# Patient Record
Sex: FEMALE | Race: WHITE | NOT HISPANIC OR LATINO | URBAN - METROPOLITAN AREA
[De-identification: names, ages, dates, MRNs, and addresses within clinical notes are randomized per-mention and may not be internally consistent; named-entity substitution may affect disease eponyms.]

---

## 2020-06-04 ENCOUNTER — OFFICE VISIT (OUTPATIENT)
Dept: URBAN - METROPOLITAN AREA CLINIC 113 | Facility: CLINIC | Age: 45
End: 2020-06-04

## 2020-07-22 ENCOUNTER — OFFICE VISIT (OUTPATIENT)
Dept: URBAN - METROPOLITAN AREA CLINIC 113 | Facility: CLINIC | Age: 45
End: 2020-07-22

## 2020-07-25 ENCOUNTER — TELEPHONE ENCOUNTER (OUTPATIENT)
Dept: URBAN - METROPOLITAN AREA CLINIC 13 | Facility: CLINIC | Age: 45
End: 2020-07-25

## 2020-07-25 RX ORDER — OMEPRAZOLE 20 MG/1
TAKE 1 CAPSULE BY MOUTH ONE HOUR BEFORE BREAKFAST CAPSULE, DELAYED RELEASE ORAL
Qty: 30 | Refills: 3 | OUTPATIENT
Start: 2018-06-12 | End: 2020-01-07

## 2020-07-25 RX ORDER — ONDANSETRON 4 MG/1
TAKE 1 TABLET EVERY 8 HOURS PRN NAUSEA TABLET, ORALLY DISINTEGRATING ORAL
Qty: 28 | Refills: 1 | OUTPATIENT
Start: 2019-01-14 | End: 2019-04-16

## 2020-07-25 RX ORDER — DICYCLOMINE HYDROCHLORIDE 10 MG/1
TAKE 1 CAPSULE EVERY 6 HOURS PRN ABDOMINAL PAIN CAPSULE ORAL
Qty: 45 | Refills: 2 | OUTPATIENT
Start: 2019-01-14 | End: 2019-04-16

## 2020-07-26 ENCOUNTER — TELEPHONE ENCOUNTER (OUTPATIENT)
Dept: URBAN - METROPOLITAN AREA CLINIC 13 | Facility: CLINIC | Age: 45
End: 2020-07-26

## 2020-07-26 RX ORDER — KETOROLAC TROMETHAMINE 10 MG/1
TABLET, FILM COATED ORAL
Qty: 20 | Refills: 0 | Status: ACTIVE | COMMUNITY
Start: 2018-07-17

## 2020-07-26 RX ORDER — GABAPENTIN 100 MG/1
CAPSULE ORAL
Qty: 30 | Refills: 0 | Status: ACTIVE | COMMUNITY
Start: 2018-05-16

## 2020-07-26 RX ORDER — LEVOTHYROXINE SODIUM 125 UG/1
TABLET ORAL
Qty: 30 | Refills: 0 | Status: ACTIVE | COMMUNITY
Start: 2019-08-08

## 2020-07-26 RX ORDER — LISINOPRIL AND HYDROCHLOROTHIAZIDE TABLETS 10; 12.5 MG/1; MG/1
TAKE 1 TABLET DAILY TABLET ORAL
Refills: 0 | Status: ACTIVE | COMMUNITY

## 2020-07-26 RX ORDER — OXYCODONE AND ACETAMINOPHEN 7.5; 325 MG/1; MG/1
TABLET ORAL
Qty: 30 | Refills: 0 | Status: ACTIVE | COMMUNITY
Start: 2018-11-29

## 2020-07-26 RX ORDER — LEVOTHYROXINE SODIUM 137 UG/1
TABLET ORAL
Qty: 30 | Refills: 0 | Status: ACTIVE | COMMUNITY
Start: 2020-02-10

## 2020-07-26 RX ORDER — LEVOTHYROXINE SODIUM 125 UG/1
TABLET ORAL
Qty: 30 | Refills: 0 | Status: ACTIVE | COMMUNITY
Start: 2019-02-25

## 2020-07-26 RX ORDER — TRAMADOL HYDROCHLORIDE AND ACETAMINOPHEN 37.5; 325 MG/1; MG/1
TAKE 1 TABLET BY MOUTH ONCE DAILY TABLET, FILM COATED ORAL
Qty: 30 | Refills: 0 | Status: ACTIVE | COMMUNITY
Start: 2019-09-18

## 2020-07-26 RX ORDER — LEVOTHYROXINE SODIUM 137 UG/1
TAKE 1 TABLET BY MOUTH AS DIRECTED TABLET ORAL
Qty: 30 | Refills: 0 | Status: ACTIVE | COMMUNITY
Start: 2020-02-04

## 2020-07-26 RX ORDER — LEVOTHYROXINE SODIUM 137 UG/1
TABLET ORAL
Qty: 30 | Refills: 0 | Status: ACTIVE | COMMUNITY
Start: 2020-05-24

## 2020-07-26 RX ORDER — FENOFIBRATE 145 MG/1
TAKE 1 TABLET DAILY TABLET, FILM COATED ORAL
Refills: 0 | Status: ACTIVE | COMMUNITY

## 2020-07-26 RX ORDER — ATORVASTATIN CALCIUM 40 MG/1
TABLET, FILM COATED ORAL
Qty: 30 | Refills: 0 | Status: ACTIVE | COMMUNITY
Start: 2018-12-17

## 2020-07-26 RX ORDER — LEVOTHYROXINE SODIUM 112 UG/1
TABLET ORAL
Qty: 30 | Refills: 0 | Status: ACTIVE | COMMUNITY
Start: 2018-12-01

## 2020-07-26 RX ORDER — ESTRADIOL 1 MG/1
TAKE 1 TABLET DAILY AS DIRECTED TABLET ORAL
Refills: 0 | Status: ACTIVE | COMMUNITY

## 2020-07-26 RX ORDER — CYANOCOBALAMIN 1000 UG/ML
INJECT 1ML INTRAMUSCULARLY WEEKLY INJECTION INTRAMUSCULAR; SUBCUTANEOUS
Refills: 3 | Status: ACTIVE | COMMUNITY

## 2020-07-26 RX ORDER — LEVOTHYROXINE SODIUM 125 UG/1
TABLET ORAL
Qty: 30 | Refills: 0 | Status: ACTIVE | COMMUNITY
Start: 2019-01-21

## 2020-07-26 RX ORDER — LEVOTHYROXINE SODIUM 125 UG/1
TABLET ORAL
Qty: 30 | Refills: 0 | Status: ACTIVE | COMMUNITY
Start: 2019-03-29

## 2020-07-26 RX ORDER — PANTOPRAZOLE SODIUM 40 MG/1
TAKE ONE TABLET TWO TIMES A DAY TABLET, DELAYED RELEASE ORAL
Qty: 60 | Refills: 3 | Status: ACTIVE | COMMUNITY
Start: 2020-07-22

## 2020-07-26 RX ORDER — CEFUROXIME AXETIL 500 MG/1
TABLET, FILM COATED ORAL
Qty: 14 | Refills: 0 | Status: ACTIVE | COMMUNITY
Start: 2020-05-04

## 2020-07-26 RX ORDER — LEVOTHYROXINE SODIUM 125 UG/1
TAKE 1 TABLET BY MOUTH ONCE DAILY TABLET ORAL
Qty: 30 | Refills: 0 | Status: ACTIVE | COMMUNITY
Start: 2019-07-08

## 2020-07-26 RX ORDER — LEVOTHYROXINE SODIUM 100 UG/1
TAKE 1 TABLET DAILY TABLET ORAL
Refills: 0 | Status: ACTIVE | COMMUNITY

## 2020-07-26 RX ORDER — AMOXICILLIN 500 MG/1
CAPSULE ORAL
Qty: 20 | Refills: 0 | Status: ACTIVE | COMMUNITY
Start: 2019-01-21

## 2020-07-26 RX ORDER — PREDNISONE 20 MG/1
TABLET ORAL
Qty: 5 | Refills: 0 | Status: ACTIVE | COMMUNITY
Start: 2019-12-16

## 2020-07-26 RX ORDER — AMITRIPTYLINE HYDROCHLORIDE 10 MG/1
TAKE 1 TABLET BY MOUTH AT BEDTIME TABLET, FILM COATED ORAL
Qty: 30 | Refills: 5 | Status: ACTIVE | COMMUNITY
Start: 2020-01-07

## 2020-07-26 RX ORDER — FENOFIBRATE 54 MG/1
TABLET ORAL
Qty: 30 | Refills: 0 | Status: ACTIVE | COMMUNITY
Start: 2018-09-24

## 2020-07-26 RX ORDER — ATORVASTATIN CALCIUM 40 MG/1
TAKE 1 TABLET DAILY AS DIRECTED TABLET, FILM COATED ORAL
Refills: 0 | Status: ACTIVE | COMMUNITY

## 2020-08-24 ENCOUNTER — OFFICE VISIT (OUTPATIENT)
Dept: URBAN - METROPOLITAN AREA CLINIC 113 | Facility: CLINIC | Age: 45
End: 2020-08-24
Payer: COMMERCIAL

## 2020-08-24 VITALS — BODY MASS INDEX: 29.45 KG/M2 | RESPIRATION RATE: 18 BRPM | WEIGHT: 150 LBS | HEIGHT: 60 IN

## 2020-08-24 DIAGNOSIS — K20.9 ESOPHAGITIS: ICD-10-CM

## 2020-08-24 DIAGNOSIS — R11.2 INTRACTABLE VOMITING WITH NAUSEA, UNSPECIFIED VOMITING TYPE: ICD-10-CM

## 2020-08-24 DIAGNOSIS — R10.84 GENERALIZED ABDOMINAL PAIN: ICD-10-CM

## 2020-08-24 PROCEDURE — G9903 PT SCRN TBCO ID AS NON USER: HCPCS | Performed by: INTERNAL MEDICINE

## 2020-08-24 PROCEDURE — 1036F TOBACCO NON-USER: CPT | Performed by: INTERNAL MEDICINE

## 2020-08-24 PROCEDURE — G8421 BMI NOT CALCULATED: HCPCS | Performed by: INTERNAL MEDICINE

## 2020-08-24 PROCEDURE — 99213 OFFICE O/P EST LOW 20 MIN: CPT | Performed by: INTERNAL MEDICINE

## 2020-08-24 PROCEDURE — G8427 DOCREV CUR MEDS BY ELIG CLIN: HCPCS | Performed by: INTERNAL MEDICINE

## 2020-08-24 PROCEDURE — 83036 HEMOGLOBIN GLYCOSYLATED A1C: CPT | Performed by: INTERNAL MEDICINE

## 2020-08-24 RX ORDER — LEVOTHYROXINE SODIUM 137 UG/1
TAKE 1 TABLET BY MOUTH AS DIRECTED TABLET ORAL
Qty: 30 | Refills: 0 | Status: DISCONTINUED | COMMUNITY
Start: 2020-02-04

## 2020-08-24 RX ORDER — LEVOTHYROXINE SODIUM 125 UG/1
TABLET ORAL
Qty: 30 | Refills: 0 | Status: DISCONTINUED | COMMUNITY
Start: 2019-01-21

## 2020-08-24 RX ORDER — LEVOTHYROXINE SODIUM 100 UG/1
TAKE 1 TABLET DAILY TABLET ORAL
Refills: 0 | Status: ACTIVE | COMMUNITY

## 2020-08-24 RX ORDER — LEVOTHYROXINE SODIUM 112 UG/1
TABLET ORAL
Qty: 30 | Refills: 0 | Status: DISCONTINUED | COMMUNITY
Start: 2018-12-01

## 2020-08-24 RX ORDER — AMOXICILLIN 500 MG/1
CAPSULE ORAL
Qty: 20 | Refills: 0 | Status: DISCONTINUED | COMMUNITY
Start: 2019-01-21

## 2020-08-24 RX ORDER — GABAPENTIN 100 MG/1
CAPSULE ORAL
Qty: 30 | Refills: 0 | Status: DISCONTINUED | COMMUNITY
Start: 2018-05-16

## 2020-08-24 RX ORDER — OXYCODONE AND ACETAMINOPHEN 7.5; 325 MG/1; MG/1
TABLET ORAL
Qty: 30 | Refills: 0 | Status: DISCONTINUED | COMMUNITY
Start: 2018-11-29

## 2020-08-24 RX ORDER — FENOFIBRATE 54 MG/1
TABLET ORAL
Qty: 30 | Refills: 0 | Status: DISCONTINUED | COMMUNITY
Start: 2018-09-24

## 2020-08-24 RX ORDER — TRAMADOL HYDROCHLORIDE AND ACETAMINOPHEN 37.5; 325 MG/1; MG/1
TAKE 1 TABLET BY MOUTH ONCE DAILY TABLET, FILM COATED ORAL
Qty: 30 | Refills: 0 | Status: DISCONTINUED | COMMUNITY
Start: 2019-09-18

## 2020-08-24 RX ORDER — PREDNISONE 20 MG/1
TABLET ORAL
Qty: 5 | Refills: 0 | Status: DISCONTINUED | COMMUNITY
Start: 2019-12-16

## 2020-08-24 RX ORDER — AMITRIPTYLINE HYDROCHLORIDE 10 MG/1
TAKE 1 TABLET BY MOUTH AT BEDTIME TABLET, FILM COATED ORAL
Qty: 30 | Refills: 5 | Status: ACTIVE | COMMUNITY
Start: 2020-01-07

## 2020-08-24 RX ORDER — PANTOPRAZOLE SODIUM 40 MG/1
TAKE ONE TABLET TWO TIMES A DAY TABLET, DELAYED RELEASE ORAL
Qty: 60 | Refills: 3 | Status: ACTIVE | COMMUNITY
Start: 2020-07-22

## 2020-08-24 RX ORDER — ATORVASTATIN CALCIUM 40 MG/1
TAKE 1 TABLET DAILY AS DIRECTED TABLET, FILM COATED ORAL
Refills: 0 | Status: DISCONTINUED | COMMUNITY

## 2020-08-24 RX ORDER — ESTRADIOL 1 MG/1
TAKE 1 TABLET DAILY AS DIRECTED TABLET ORAL
Refills: 0 | Status: ACTIVE | COMMUNITY

## 2020-08-24 RX ORDER — KETOROLAC TROMETHAMINE 10 MG/1
TABLET, FILM COATED ORAL
Qty: 20 | Refills: 0 | Status: DISCONTINUED | COMMUNITY
Start: 2018-07-17

## 2020-08-24 RX ORDER — LISINOPRIL AND HYDROCHLOROTHIAZIDE TABLETS 10; 12.5 MG/1; MG/1
TAKE 1 TABLET DAILY TABLET ORAL
Refills: 0 | Status: ACTIVE | COMMUNITY

## 2020-08-24 RX ORDER — CYANOCOBALAMIN 1000 UG/ML
INJECT 1ML INTRAMUSCULARLY WEEKLY INJECTION INTRAMUSCULAR; SUBCUTANEOUS
Refills: 3 | Status: ACTIVE | COMMUNITY

## 2020-08-24 RX ORDER — FENOFIBRATE 145 MG/1
TAKE 1 TABLET DAILY TABLET, FILM COATED ORAL
Refills: 0 | Status: ACTIVE | COMMUNITY

## 2020-08-24 RX ORDER — CEFUROXIME AXETIL 500 MG/1
TABLET, FILM COATED ORAL
Qty: 14 | Refills: 0 | Status: DISCONTINUED | COMMUNITY
Start: 2020-05-04

## 2020-08-24 NOTE — EXAM-PHYSICAL EXAM
Const: NAD, comfortable HEENT: NCAT Skin: no jaundice Resp: non-labored Abd: soft per pt Extrem: no focal weakness appreciated Psych: normal affect/judgement  Observed via tele-medicine visit.

## 2020-08-24 NOTE — HPI-TODAY'S VISIT:
45 y/o female presenting for f/u regarding abdominal pain. She was last seen in the clinic on July 22, 2020. At that visit we recommended labs, increased her PPI to BID, a GES, and recommended against NSAIDs.  She has had continued nausea/emesis. She did get her GES done but we were not sent any results; will request.   She forgot to get the labs performed. She had her GES done 3 weeks ago at [facility] in [place].   She states that the last week or two has been good but she has had to miss workbefore seeing us several weeks ago.

## 2020-08-24 NOTE — HPI-OTHER HISTORIES
7/22/2020 Ms. Romero is a 44-year-old female with a history of hypertension, hyperlipidemia, biliary dyskinesia status post cholecystectomy, GERD, elevated liver enzymes secondary to fatty liver, adenomatous colon polyps due surveillance in 2023, and functional abdominal pain presenting for follow-up. She was last seen 3/4/20. She reported minimal abdominal pain since beginning amitriptyline. She had experienced mild constipation reporting intermittent days when she did not have a bowel movement. She had started Benefiber 2 days before her visit. She reported daily episodes of heartburn for 2 weeks for which she required antacids in addition to daily PPI. She has not needed them 2 days before her visit. She was instructed to continue amitriptyline and Benefiber. She was to add MiraLAX daily if needed. She was to continue pantoprazole and antacids as needed. She reports onset of episodic pain indicated in the epigastrium 2 months ago. She is experiencing symptoms at least once a week usually associated with eating "heavy" food. She has a sensation that food "stops" in the area of the epigastrium causing nausea and occasional vomiting. The following day, she eats small amounts or fasts for periods and symptoms improve. The pain may last hours or an entire day. She ate a small chicken quesadilla yesterday and had pain throughout the night. She denies heartburn or regurgitation. She is compliant with pantoprazole daily. She is taking Benefiber daily and has a bowel movement every day. She reports loose stools with a sensation of incomplete evacuation associated with episodes of pain. She has lost 6 pounds in the last 2 months since onset of symptoms. She admits taking naproxen or ibuprofen 2 or 3 times a week. She is compliant with amitriptyline.

## 2020-08-29 LAB
A/G RATIO: 2
ABSOLUTE BASOPHILS: 52
ABSOLUTE EOSINOPHILS: 99
ABSOLUTE LYMPHOCYTES: 2137
ABSOLUTE MONOCYTES: 395
ABSOLUTE NEUTROPHILS: 2517
ALBUMIN: 4.7
ALKALINE PHOSPHATASE: 45
ALT (SGPT): 21
AMYLASE: 81
AST (SGOT): 23
BASOPHILS: 1
BILIRUBIN, TOTAL: 0.4
BUN/CREATININE RATIO: (no result)
BUN: 14
CALCIUM: 10.4
CARBON DIOXIDE, TOTAL: 24
CHLORIDE: 104
CREATININE: 0.81
EGFR AFRICAN AMERICAN: 101
EGFR NON-AFR. AMERICAN: 87
EOSINOPHILS: 1.9
GLOBULIN, TOTAL: 2.4
GLUCOSE: 88
HEMATOCRIT: 35.5
HEMOGLOBIN A1C: 5.3
HEMOGLOBIN: 11.7
LIPASE: 26
LYMPHOCYTES: 41.1
MCH: 29.5
MCHC: 33
MCV: 89.4
MONOCYTES: 7.6
MPV: 10.7
NEUTROPHILS: 48.4
PLATELET COUNT: 193
POTASSIUM: 5
PROTEIN, TOTAL: 7.1
RDW: 13.5
RED BLOOD CELL COUNT: 3.97
SODIUM: 139
WHITE BLOOD CELL COUNT: 5.2

## 2020-09-04 ENCOUNTER — OFFICE VISIT (OUTPATIENT)
Dept: URBAN - METROPOLITAN AREA SURGERY CENTER 25 | Facility: SURGERY CENTER | Age: 45
End: 2020-09-04
Payer: COMMERCIAL

## 2020-09-04 ENCOUNTER — CLAIMS CREATED FROM THE CLAIM WINDOW (OUTPATIENT)
Dept: URBAN - METROPOLITAN AREA CLINIC 4 | Facility: CLINIC | Age: 45
End: 2020-09-04
Payer: COMMERCIAL

## 2020-09-04 DIAGNOSIS — K31.89 ACQUIRED DEFORMITY OF DUODENUM: ICD-10-CM

## 2020-09-04 DIAGNOSIS — K29.40 CHRONIC ATROPHIC GASTRITIS WITHOUT BLEEDING: ICD-10-CM

## 2020-09-04 DIAGNOSIS — K29.60 OTHER GASTRITIS WITHOUT BLEEDING: ICD-10-CM

## 2020-09-04 DIAGNOSIS — R12 BURNING REFLUX: ICD-10-CM

## 2020-09-04 DIAGNOSIS — K29.40 ATROPHIC GASTRITIS: ICD-10-CM

## 2020-09-04 PROCEDURE — 88305 TISSUE EXAM BY PATHOLOGIST: CPT | Performed by: PATHOLOGY

## 2020-09-04 PROCEDURE — 88342 IMHCHEM/IMCYTCHM 1ST ANTB: CPT | Performed by: PATHOLOGY

## 2020-09-04 PROCEDURE — 43239 EGD BIOPSY SINGLE/MULTIPLE: CPT | Performed by: INTERNAL MEDICINE

## 2020-09-04 PROCEDURE — 88341 IMHCHEM/IMCYTCHM EA ADD ANTB: CPT | Performed by: PATHOLOGY

## 2020-09-04 PROCEDURE — G8907 PT DOC NO EVENTS ON DISCHARG: HCPCS | Performed by: INTERNAL MEDICINE

## 2020-09-04 RX ORDER — CYANOCOBALAMIN 1000 UG/ML
INJECT 1ML INTRAMUSCULARLY WEEKLY INJECTION INTRAMUSCULAR; SUBCUTANEOUS
Refills: 3 | Status: ACTIVE | COMMUNITY

## 2020-09-04 RX ORDER — PANTOPRAZOLE SODIUM 40 MG/1
TAKE ONE TABLET TWO TIMES A DAY TABLET, DELAYED RELEASE ORAL
Qty: 60 | Refills: 3 | Status: ACTIVE | COMMUNITY
Start: 2020-07-22

## 2020-09-04 RX ORDER — ESTRADIOL 1 MG/1
TAKE 1 TABLET DAILY AS DIRECTED TABLET ORAL
Refills: 0 | Status: ACTIVE | COMMUNITY

## 2020-09-04 RX ORDER — FENOFIBRATE 145 MG/1
TAKE 1 TABLET DAILY TABLET, FILM COATED ORAL
Refills: 0 | Status: ACTIVE | COMMUNITY

## 2020-09-04 RX ORDER — LISINOPRIL AND HYDROCHLOROTHIAZIDE TABLETS 10; 12.5 MG/1; MG/1
TAKE 1 TABLET DAILY TABLET ORAL
Refills: 0 | Status: ACTIVE | COMMUNITY

## 2020-09-04 RX ORDER — AMITRIPTYLINE HYDROCHLORIDE 10 MG/1
TAKE 1 TABLET BY MOUTH AT BEDTIME TABLET, FILM COATED ORAL
Qty: 30 | Refills: 5 | Status: ACTIVE | COMMUNITY
Start: 2020-01-07

## 2020-09-04 RX ORDER — LEVOTHYROXINE SODIUM 100 UG/1
TAKE 1 TABLET DAILY TABLET ORAL
Refills: 0 | Status: ACTIVE | COMMUNITY

## 2020-09-07 ENCOUNTER — WEB ENCOUNTER (OUTPATIENT)
Dept: URBAN - METROPOLITAN AREA CLINIC 113 | Facility: CLINIC | Age: 45
End: 2020-09-07

## 2020-10-06 ENCOUNTER — OFFICE VISIT (OUTPATIENT)
Dept: URBAN - METROPOLITAN AREA CLINIC 113 | Facility: CLINIC | Age: 45
End: 2020-10-06

## 2020-10-06 ENCOUNTER — OFFICE VISIT (OUTPATIENT)
Dept: URBAN - METROPOLITAN AREA TELEHEALTH 2 | Facility: TELEHEALTH | Age: 45
End: 2020-10-06
Payer: COMMERCIAL

## 2020-10-06 VITALS — RESPIRATION RATE: 18 BRPM | HEIGHT: 60 IN | BODY MASS INDEX: 29.45 KG/M2 | WEIGHT: 150 LBS

## 2020-10-06 DIAGNOSIS — K29.40 ATROPHIC GASTRITIS WITHOUT HEMORRHAGE: ICD-10-CM

## 2020-10-06 DIAGNOSIS — K31.89 INTESTINAL METAPLASIA OF GASTRIC MUCOSA: ICD-10-CM

## 2020-10-06 DIAGNOSIS — R11.2 INTRACTABLE VOMITING WITH NAUSEA, UNSPECIFIED VOMITING TYPE: ICD-10-CM

## 2020-10-06 PROCEDURE — 82746 ASSAY OF FOLIC ACID SERUM: CPT | Performed by: INTERNAL MEDICINE

## 2020-10-06 PROCEDURE — G9902 PT SCRN TBCO AND ID AS USER: HCPCS | Performed by: INTERNAL MEDICINE

## 2020-10-06 PROCEDURE — 4004F PT TOBACCO SCREEN RCVD TLK: CPT | Performed by: INTERNAL MEDICINE

## 2020-10-06 PROCEDURE — G8428 CUR MEDS NOT DOCUMENT: HCPCS | Performed by: INTERNAL MEDICINE

## 2020-10-06 PROCEDURE — 82607 VITAMIN B-12: CPT | Performed by: INTERNAL MEDICINE

## 2020-10-06 PROCEDURE — G9906 PT RECV TBCO CESS INTERV: HCPCS | Performed by: INTERNAL MEDICINE

## 2020-10-06 PROCEDURE — G8484 FLU IMMUNIZE NO ADMIN: HCPCS | Performed by: INTERNAL MEDICINE

## 2020-10-06 PROCEDURE — G8421 BMI NOT CALCULATED: HCPCS | Performed by: INTERNAL MEDICINE

## 2020-10-06 PROCEDURE — 99213 OFFICE O/P EST LOW 20 MIN: CPT | Performed by: INTERNAL MEDICINE

## 2020-10-06 RX ORDER — FENOFIBRATE 145 MG/1
TAKE 1 TABLET DAILY TABLET, FILM COATED ORAL
Refills: 0 | Status: ACTIVE | COMMUNITY

## 2020-10-06 RX ORDER — CYANOCOBALAMIN 1000 UG/ML
INJECT 1ML INTRAMUSCULARLY WEEKLY INJECTION INTRAMUSCULAR; SUBCUTANEOUS
Refills: 3 | COMMUNITY

## 2020-10-06 RX ORDER — AMITRIPTYLINE HYDROCHLORIDE 10 MG/1
TAKE 1 TABLET BY MOUTH AT BEDTIME TABLET, FILM COATED ORAL
Qty: 30 | Refills: 5 | Status: ACTIVE | COMMUNITY
Start: 2020-01-07

## 2020-10-06 RX ORDER — LISINOPRIL AND HYDROCHLOROTHIAZIDE TABLETS 10; 12.5 MG/1; MG/1
TAKE 1 TABLET DAILY TABLET ORAL
Refills: 0 | Status: ACTIVE | COMMUNITY

## 2020-10-06 RX ORDER — PANTOPRAZOLE SODIUM 40 MG/1
TAKE ONE TABLET TWO TIMES A DAY TABLET, DELAYED RELEASE ORAL
Qty: 60 | Refills: 3 | Status: ACTIVE | COMMUNITY
Start: 2020-07-22

## 2020-10-06 RX ORDER — CYANOCOBALAMIN 1000 UG/ML
INJECT 1ML INTRAMUSCULARLY WEEKLY INJECTION INTRAMUSCULAR; SUBCUTANEOUS
Refills: 3 | Status: ACTIVE | COMMUNITY

## 2020-10-06 RX ORDER — RABEPRAZOLE SODIUM 20 MG/1
1 TABLET TABLET, DELAYED RELEASE ORAL ONCE A DAY
Qty: 30 TABLET | Refills: 1 | OUTPATIENT
Start: 2020-10-06

## 2020-10-06 RX ORDER — ESTRADIOL 1 MG/1
TAKE 1 TABLET DAILY AS DIRECTED TABLET ORAL
Refills: 0 | COMMUNITY

## 2020-10-06 RX ORDER — LISINOPRIL AND HYDROCHLOROTHIAZIDE TABLETS 10; 12.5 MG/1; MG/1
TAKE 1 TABLET DAILY TABLET ORAL
Refills: 0 | COMMUNITY

## 2020-10-06 RX ORDER — LEVOTHYROXINE SODIUM 100 UG/1
TAKE 1 TABLET DAILY TABLET ORAL
Refills: 0 | COMMUNITY

## 2020-10-06 RX ORDER — LEVOTHYROXINE SODIUM 100 UG/1
TAKE 1 TABLET DAILY TABLET ORAL
Refills: 0 | Status: ACTIVE | COMMUNITY

## 2020-10-06 RX ORDER — ESTRADIOL 1 MG/1
TAKE 1 TABLET DAILY AS DIRECTED TABLET ORAL
Refills: 0 | Status: ACTIVE | COMMUNITY

## 2020-10-06 RX ORDER — PANTOPRAZOLE SODIUM 40 MG/1
TAKE ONE TABLET TWO TIMES A DAY TABLET, DELAYED RELEASE ORAL
Qty: 60 | Refills: 3 | COMMUNITY
Start: 2020-07-22

## 2020-10-06 RX ORDER — FENOFIBRATE 145 MG/1
TAKE 1 TABLET DAILY TABLET, FILM COATED ORAL
Refills: 0 | COMMUNITY

## 2020-10-06 RX ORDER — AMITRIPTYLINE HYDROCHLORIDE 10 MG/1
TAKE 1 TABLET BY MOUTH AT BEDTIME TABLET, FILM COATED ORAL
Qty: 30 | Refills: 5 | COMMUNITY
Start: 2020-01-07

## 2020-10-06 NOTE — HPI-TODAY'S VISIT:
46 y/o female presenting for f/u. She did have an EGD recently which demonstrated autoimmune gastritis and focal metaplasia on Bx. She also had food in her stomach. She continues to have intermittent nausea/emesis. She has been told she needs to "watch out" for diabetes but has not had a diagnosis of this. She does have hypothyroidism and is on synthroid.   8/24/2020 43 y/o female presenting for f/u regarding abdominal pain. She was last seen in the clinic on July 22, 2020. At that visit we recommended labs, increased her PPI to BID, a GES, and recommended against NSAIDs.  She has had continued nausea/emesis. She did get her GES done but we were not sent any results; will request.   She forgot to get the labs performed. She had her GES done 3 weeks ago at [facility] in [place].   She states that the last week or two has been good but she has had to miss workbefore seeing us several weeks ago.

## 2020-10-06 NOTE — HPI-TODAY'S VISIT:
44 y/o female presenting for f/u re: abdominal pain and nausea/emesis. She was last seen 8/24/2020. At her last visit we updated her labs as well as scheduled an EGD. On Egd she did have gastritis as well as food in her stomach. 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  ----- EGD 9/2020--food in stomach, autoimmune gastritis, focal intestinal metaplasia.   ----- 8/24/2020 43 y/o female presenting for f/u regarding abdominal pain. She was last seen in the clinic on July 22, 2020. At that visit we recommended labs, increased her PPI to BID, a GES, and recommended against NSAIDs.  She has had continued nausea/emesis. She did get her GES done but we were not sent any results; will request.   She forgot to get the labs performed. She had her GES done 3 weeks ago at [facility] in [place].   She states that the last week or two has been good but she has had to miss workbefore seeing us several weeks ago.

## 2020-11-01 ENCOUNTER — WEB ENCOUNTER (OUTPATIENT)
Dept: URBAN - METROPOLITAN AREA CLINIC 113 | Facility: CLINIC | Age: 45
End: 2020-11-01

## 2020-11-01 ENCOUNTER — ERX REFILL RESPONSE (OUTPATIENT)
Age: 45
End: 2020-11-01

## 2020-11-01 RX ORDER — AMITRIPTYLINE HYDROCHLORIDE 10 MG/1
TAKE 1 TABLET BY MOUTH AT BEDTIME TABLET, FILM COATED ORAL
Qty: 30 | Refills: 0

## 2020-11-01 RX ORDER — AMITRIPTYLINE HYDROCHLORIDE 10 MG/1
TAKE 1 TABLET BY MOUTH AT BEDTIME TABLET, FILM COATED ORAL ONCE A DAY
Qty: 30 | Refills: 5

## 2020-11-09 ENCOUNTER — TELEPHONE ENCOUNTER (OUTPATIENT)
Dept: URBAN - METROPOLITAN AREA CLINIC 113 | Facility: CLINIC | Age: 45
End: 2020-11-09

## 2020-12-03 ENCOUNTER — TELEPHONE ENCOUNTER (OUTPATIENT)
Dept: URBAN - METROPOLITAN AREA CLINIC 113 | Facility: CLINIC | Age: 45
End: 2020-12-03

## 2020-12-04 ENCOUNTER — TELEPHONE ENCOUNTER (OUTPATIENT)
Dept: URBAN - METROPOLITAN AREA CLINIC 113 | Facility: CLINIC | Age: 45
End: 2020-12-04

## 2020-12-04 RX ORDER — PANTOPRAZOLE SODIUM 40 MG/1
1 TABLET TABLET, DELAYED RELEASE ORAL ONCE A DAY
Qty: 90 TABLET | Refills: 3 | OUTPATIENT
Start: 2020-12-07

## 2021-01-07 ENCOUNTER — WEB ENCOUNTER (OUTPATIENT)
Dept: URBAN - METROPOLITAN AREA CLINIC 113 | Facility: CLINIC | Age: 46
End: 2021-01-07

## 2021-01-07 ENCOUNTER — OFFICE VISIT (OUTPATIENT)
Dept: URBAN - METROPOLITAN AREA CLINIC 113 | Facility: CLINIC | Age: 46
End: 2021-01-07
Payer: COMMERCIAL

## 2021-01-07 VITALS
TEMPERATURE: 97.5 F | HEIGHT: 60 IN | DIASTOLIC BLOOD PRESSURE: 80 MMHG | HEART RATE: 80 BPM | WEIGHT: 179 LBS | BODY MASS INDEX: 35.14 KG/M2 | RESPIRATION RATE: 18 BRPM | SYSTOLIC BLOOD PRESSURE: 127 MMHG

## 2021-01-07 DIAGNOSIS — K21.00 GASTROESOPHAGEAL REFLUX DISEASE WITH ESOPHAGITIS WITHOUT HEMORRHAGE: ICD-10-CM

## 2021-01-07 DIAGNOSIS — R11.2 INTRACTABLE VOMITING WITH NAUSEA, UNSPECIFIED VOMITING TYPE: ICD-10-CM

## 2021-01-07 DIAGNOSIS — E53.8 B12 DEFICIENCY: ICD-10-CM

## 2021-01-07 DIAGNOSIS — K59.09 OTHER CONSTIPATION: ICD-10-CM

## 2021-01-07 DIAGNOSIS — R10.84 GENERALIZED ABDOMINAL PAIN: ICD-10-CM

## 2021-01-07 PROCEDURE — G9903 PT SCRN TBCO ID AS NON USER: HCPCS | Performed by: NURSE PRACTITIONER

## 2021-01-07 PROCEDURE — G8427 DOCREV CUR MEDS BY ELIG CLIN: HCPCS | Performed by: NURSE PRACTITIONER

## 2021-01-07 PROCEDURE — 99213 OFFICE O/P EST LOW 20 MIN: CPT | Performed by: NURSE PRACTITIONER

## 2021-01-07 RX ORDER — LEVOTHYROXINE SODIUM 100 UG/1
TAKE 1 TABLET DAILY TABLET ORAL
Refills: 0 | Status: ACTIVE | COMMUNITY

## 2021-01-07 RX ORDER — CYANOCOBALAMIN 1000 UG/ML
AS DIRECTED INJECTION INTRAMUSCULAR; SUBCUTANEOUS WEEKLY
Refills: 3 | Status: ACTIVE | COMMUNITY

## 2021-01-07 RX ORDER — LISINOPRIL AND HYDROCHLOROTHIAZIDE TABLETS 10; 12.5 MG/1; MG/1
TAKE 1 TABLET DAILY TABLET ORAL
Refills: 0 | Status: ACTIVE | COMMUNITY

## 2021-01-07 RX ORDER — ESTRADIOL 1 MG/1
TAKE 1 TABLET DAILY AS DIRECTED TABLET ORAL
Refills: 0 | Status: ACTIVE | COMMUNITY

## 2021-01-07 RX ORDER — AMITRIPTYLINE HYDROCHLORIDE 10 MG/1
TAKE 1 TABLET BY MOUTH AT BEDTIME TABLET, FILM COATED ORAL
Qty: 30 | Refills: 0 | Status: ACTIVE | COMMUNITY

## 2021-01-07 RX ORDER — PANTOPRAZOLE SODIUM 40 MG/1
1 TABLET TABLET, DELAYED RELEASE ORAL ONCE A DAY
Qty: 90 TABLET | Refills: 3 | Status: ACTIVE | COMMUNITY
Start: 2020-12-07

## 2021-01-07 RX ORDER — PANTOPRAZOLE SODIUM 40 MG/1
1 TABLET TABLET, DELAYED RELEASE ORAL TWICE A DAY
Qty: 180 TABLET | Refills: 3

## 2021-01-07 RX ORDER — FENOFIBRATE 145 MG/1
TAKE 1 TABLET DAILY TABLET, FILM COATED ORAL
Refills: 0 | Status: ACTIVE | COMMUNITY

## 2021-01-07 NOTE — HPI-TODAY'S VISIT:
This is a 45-year-old female with a history of autoimmune gastritis and focal metaplasia, intermittent nausea and vomiting and generalized abdominal pain presenting for follow-up.  She was last seen 10/6/2020.  She had a gastric emptying study performed but the results were not available at the time of her visit.  She had previous labs ordered which she had forgotten to complete.  She had improved some 2 weeks before her visit.  Labs were ordered.  She was instructed to repeat her EGD in a year.  She was prescribed AcipHex and instructed to observe small, frequent meals.  She called our office in early December stating that AcipHex was no longer working.  She was prescribed pantoprazole.  She reports occasional bad days during which she has abdominal pain.  This occurs once or twice a week.  She states that "puts me in bed."  She has occasional associated dizziness.  Afterward, she feels fatigued.  She is taking pantoprazole 40 mg daily.  She reports symptoms were better control when she was on twice a day medication.  She is having heartburn 3 times a week for which she is taking Tums.  She denies dysphagia.  She has intermittent episodes of nausea.  She vomits once a week or less.  She has modified her diet and, when she has episodes, she limits food and drinks liquids.  She feels as though abdominal pain and nausea is manageable and does not feel as though she would benefit from a medication for symptomatic relief.  She is having constipation.  She reports days each week when she does not have a bowel movement.  She is taking magnesium citrate once a week.  She has tried Benefiber in the past and reports some benefit.  She tried taking Metamucil and it worsened her symptoms.  She has not tried MiraLAX. Her weight is stable. She receives B12 injections weekly with her primary care provider.  She previously had a gastric emptying study at [facility]. The results are unavailable.  Labs 10/21/2020 : Vitamin B12 1004, folate 4.1, antiparietal cell antibody 83.3, intrinsic factor 1.0.  CBC: WBC 5.3, hemoglobin 11.6, MCV 91, platelet 193.  BMP normal.  LFTs normal TB 0.2, ALP 49, ALT 21, AST 20.  TSH 0.383.  Free T4 1.66.  Lipid panel normal with the exception of cholesterol 200, triglycerides 317,  VLDL 53.

## 2021-01-09 PROBLEM — 16761005: Status: ACTIVE | Noted: 2020-08-24

## 2021-01-09 PROBLEM — 84568007: Status: ACTIVE | Noted: 2020-10-06

## 2021-01-09 PROBLEM — 72519002: Status: ACTIVE | Noted: 2020-10-06

## 2021-01-16 ENCOUNTER — WEB ENCOUNTER (OUTPATIENT)
Dept: URBAN - METROPOLITAN AREA CLINIC 113 | Facility: CLINIC | Age: 46
End: 2021-01-16

## 2021-01-24 PROBLEM — 235675006: Status: ACTIVE | Noted: 2021-01-24

## 2021-10-20 ENCOUNTER — ERX REFILL RESPONSE (OUTPATIENT)
Dept: URBAN - METROPOLITAN AREA CLINIC 113 | Facility: CLINIC | Age: 46
End: 2021-10-20

## 2021-10-20 RX ORDER — AMITRIPTYLINE HYDROCHLORIDE 10 MG/1
TAKE 1 TABLET BY MOUTH ONCE DAILY AT BEDTIME FOR 30 DAYS TABLET, FILM COATED ORAL
Qty: 30 TABLET | Refills: 7 | OUTPATIENT

## 2021-10-20 RX ORDER — AMITRIPTYLINE HYDROCHLORIDE 10 MG/1
TAKE 1 TABLET BY MOUTH AT BEDTIME TABLET, FILM COATED ORAL ONCE A DAY
Qty: 30 | Refills: 5 | OUTPATIENT

## 2022-02-22 ENCOUNTER — ERX REFILL RESPONSE (OUTPATIENT)
Dept: URBAN - METROPOLITAN AREA CLINIC 113 | Facility: CLINIC | Age: 47
End: 2022-02-22

## 2022-02-22 RX ORDER — PANTOPRAZOLE SODIUM 40 MG/1
TAKE 1 TABLET BY MOUTH TWICE DAILY TABLET, DELAYED RELEASE ORAL
Qty: 180 TABLET | Refills: 1 | OUTPATIENT

## 2022-09-22 ENCOUNTER — ERX REFILL RESPONSE (OUTPATIENT)
Dept: URBAN - METROPOLITAN AREA CLINIC 113 | Facility: CLINIC | Age: 47
End: 2022-09-22

## 2022-09-22 RX ORDER — AMITRIPTYLINE HYDROCHLORIDE 10 MG/1
TAKE 1 TABLET AT BEDTIME TABLET, FILM COATED ORAL
Qty: 30 EACH | Refills: 0 | OUTPATIENT

## 2022-09-22 RX ORDER — AMITRIPTYLINE HYDROCHLORIDE 10 MG/1
TAKE 1 TABLET AT BEDTIME TABLET, FILM COATED ORAL
Qty: 30 EACH | Refills: 3 | OUTPATIENT

## 2022-10-14 ENCOUNTER — TELEPHONE ENCOUNTER (OUTPATIENT)
Dept: URBAN - METROPOLITAN AREA CLINIC 113 | Facility: CLINIC | Age: 47
End: 2022-10-14

## 2022-10-14 RX ORDER — PANTOPRAZOLE SODIUM 40 MG/1
1 TABLET TABLET, DELAYED RELEASE ORAL TWICE A DAY
Qty: 180 TABLET | Refills: 3

## 2022-10-14 RX ORDER — AMITRIPTYLINE HYDROCHLORIDE 10 MG/1
TAKE 1 TABLET AT BEDTIME TABLET, FILM COATED ORAL
Qty: 30 EACH | Refills: 3

## 2022-12-19 ENCOUNTER — WEB ENCOUNTER (OUTPATIENT)
Dept: URBAN - METROPOLITAN AREA CLINIC 113 | Facility: CLINIC | Age: 47
End: 2022-12-19

## 2022-12-19 ENCOUNTER — OFFICE VISIT (OUTPATIENT)
Dept: URBAN - METROPOLITAN AREA CLINIC 113 | Facility: CLINIC | Age: 47
End: 2022-12-19
Payer: COMMERCIAL

## 2022-12-19 VITALS
RESPIRATION RATE: 18 BRPM | HEIGHT: 60 IN | DIASTOLIC BLOOD PRESSURE: 79 MMHG | SYSTOLIC BLOOD PRESSURE: 122 MMHG | HEART RATE: 72 BPM | TEMPERATURE: 97.7 F | WEIGHT: 180.6 LBS | BODY MASS INDEX: 35.46 KG/M2

## 2022-12-19 DIAGNOSIS — E53.8 B12 DEFICIENCY: ICD-10-CM

## 2022-12-19 DIAGNOSIS — K21.00 GASTROESOPHAGEAL REFLUX DISEASE WITH ESOPHAGITIS WITHOUT HEMORRHAGE: ICD-10-CM

## 2022-12-19 DIAGNOSIS — R10.84 GENERALIZED ABDOMINAL PAIN: ICD-10-CM

## 2022-12-19 DIAGNOSIS — R11.2 INTRACTABLE VOMITING WITH NAUSEA, UNSPECIFIED VOMITING TYPE: ICD-10-CM

## 2022-12-19 DIAGNOSIS — K59.09 OTHER CONSTIPATION: ICD-10-CM

## 2022-12-19 PROBLEM — 14760008: Status: ACTIVE | Noted: 2021-01-09

## 2022-12-19 PROBLEM — 190634004: Status: ACTIVE | Noted: 2021-01-09

## 2022-12-19 PROBLEM — 102614006: Status: ACTIVE | Noted: 2020-08-24

## 2022-12-19 PROBLEM — 698861005: Status: ACTIVE | Noted: 2020-08-24

## 2022-12-19 PROCEDURE — 99213 OFFICE O/P EST LOW 20 MIN: CPT | Performed by: INTERNAL MEDICINE

## 2022-12-19 RX ORDER — PANTOPRAZOLE SODIUM 40 MG/1
1 TABLET TABLET, DELAYED RELEASE ORAL TWICE A DAY
Qty: 180 TABLET | Refills: 3 | Status: ACTIVE | COMMUNITY

## 2022-12-19 RX ORDER — FENOFIBRATE 145 MG/1
TAKE 1 TABLET DAILY TABLET, FILM COATED ORAL
Refills: 0 | Status: ACTIVE | COMMUNITY

## 2022-12-19 RX ORDER — FAMOTIDINE 40 MG/1
1 TABLET AT BEDTIME TABLET, FILM COATED ORAL ONCE A DAY
Qty: 90 | Refills: 3 | OUTPATIENT
Start: 2022-12-19

## 2022-12-19 RX ORDER — LEVOTHYROXINE SODIUM 137 UG/1
1 CAPSULE IN THE MORNING ON AN EMPTY STOMACH CAPSULE ORAL ONCE A DAY
Status: ACTIVE | COMMUNITY

## 2022-12-19 RX ORDER — LEVOTHYROXINE SODIUM 100 UG/1
TAKE 1 TABLET DAILY TABLET ORAL
Refills: 0 | Status: ON HOLD | COMMUNITY

## 2022-12-19 RX ORDER — CYANOCOBALAMIN 1000 UG/ML
AS DIRECTED INJECTION INTRAMUSCULAR; SUBCUTANEOUS WEEKLY
Refills: 3 | Status: ACTIVE | COMMUNITY

## 2022-12-19 RX ORDER — PANTOPRAZOLE SODIUM 40 MG/1
TAKE 1 TABLET BY MOUTH TWICE DAILY TABLET, DELAYED RELEASE ORAL
Qty: 180 TABLET | Refills: 1 | Status: ON HOLD | COMMUNITY

## 2022-12-19 RX ORDER — ESTRADIOL 1 MG/1
TAKE 1 TABLET DAILY AS DIRECTED TABLET ORAL
Refills: 0 | Status: ACTIVE | COMMUNITY

## 2022-12-19 RX ORDER — LISINOPRIL AND HYDROCHLOROTHIAZIDE TABLETS 10; 12.5 MG/1; MG/1
TAKE 1 TABLET DAILY TABLET ORAL
Refills: 0 | Status: ACTIVE | COMMUNITY

## 2022-12-19 RX ORDER — AMITRIPTYLINE HYDROCHLORIDE 10 MG/1
TAKE 1 TABLET AT BEDTIME TABLET, FILM COATED ORAL
Qty: 30 EACH | Refills: 3 | Status: ACTIVE | COMMUNITY

## 2022-12-19 NOTE — HPI-TODAY'S VISIT:
47-year-old female presenting for follow-up.  She was last seen in January 2021.  She does have a history of autoimmune gastritis and focal intestinal metaplasia. She was last seen 2 years ago. She continues to have intermittent epigastric pain and discomfort.  Currently she does take protonix BID. She does have breakthrough.  1/7/21 This is a 45-year-old female with a history of autoimmune gastritis and focal metaplasia, intermittent nausea and vomiting and generalized abdominal pain presenting for follow-up.  She was last seen 10/6/2020.  She had a gastric emptying study performed but the results were not available at the time of her visit.  She had previous labs ordered which she had forgotten to complete.  She had improved some 2 weeks before her visit.  Labs were ordered.  She was instructed to repeat her EGD in a year.  She was prescribed AcipHex and instructed to observe small, frequent meals.  She called our office in early December stating that AcipHex was no longer working.  She was prescribed pantoprazole.  She reports occasional bad days during which she has abdominal pain.  This occurs once or twice a week.  She states that "puts me in bed."  She has occasional associated dizziness.  Afterward, she feels fatigued.  She is taking pantoprazole 40 mg daily.  She reports symptoms were better control when she was on twice a day medication.  She is having heartburn 3 times a week for which she is taking Tums.  She denies dysphagia.  She has intermittent episodes of nausea.  She vomits once a week or less.  She has modified her diet and, when she has episodes, she limits food and drinks liquids.  She feels as though abdominal pain and nausea is manageable and does not feel as though she would benefit from a medication for symptomatic relief.  She is having constipation.  She reports days each week when she does not have a bowel movement.  She is taking magnesium citrate once a week.  She has tried Benefiber in the past and reports some benefit.  She tried taking Metamucil and it worsened her symptoms.  She has not tried MiraLAX. Her weight is stable. She receives B12 injections weekly with her primary care provider.  She previously had a gastric emptying study at [facility]. The results are unavailable.  Labs 10/21/2020 : Vitamin B12 1004, folate 4.1, antiparietal cell antibody 83.3, intrinsic factor 1.0.  CBC: WBC 5.3, hemoglobin 11.6, MCV 91, platelet 193.  BMP normal.  LFTs normal TB 0.2, ALP 49, ALT 21, AST 20.  TSH 0.383.  Free T4 1.66.  Lipid panel normal with the exception of cholesterol 200, triglycerides 317,  VLDL 53.

## 2023-01-13 PROBLEM — 266433003: Status: ACTIVE | Noted: 2021-01-09

## 2023-02-01 ENCOUNTER — WEB ENCOUNTER (OUTPATIENT)
Dept: URBAN - METROPOLITAN AREA SURGERY CENTER 25 | Facility: SURGERY CENTER | Age: 48
End: 2023-02-01

## 2023-02-01 ENCOUNTER — CLAIMS CREATED FROM THE CLAIM WINDOW (OUTPATIENT)
Dept: URBAN - METROPOLITAN AREA CLINIC 4 | Facility: CLINIC | Age: 48
End: 2023-02-01
Payer: COMMERCIAL

## 2023-02-01 ENCOUNTER — OFFICE VISIT (OUTPATIENT)
Dept: URBAN - METROPOLITAN AREA SURGERY CENTER 25 | Facility: SURGERY CENTER | Age: 48
End: 2023-02-01
Payer: COMMERCIAL

## 2023-02-01 DIAGNOSIS — K29.40 CHRONIC ATROPHIC GASTRITIS WITHOUT BLEEDING: ICD-10-CM

## 2023-02-01 DIAGNOSIS — K31.89 OTHER DISEASES OF STOMACH AND DUODENUM: ICD-10-CM

## 2023-02-01 DIAGNOSIS — K21.9 GASTROESOPHAGEAL REFLUX DISEASE: ICD-10-CM

## 2023-02-01 DIAGNOSIS — K31.89 GASTRIC FOVEOLAR HYPERPLASIA: ICD-10-CM

## 2023-02-01 DIAGNOSIS — K29.40 ATROPHIC GASTRITIS: ICD-10-CM

## 2023-02-01 DIAGNOSIS — K31.A15 GASTRIC INTESTINAL METAPLASIA WITHOUT DYSPLASIA, INVOLVING MULTIPLE SITES: ICD-10-CM

## 2023-02-01 PROCEDURE — G8907 PT DOC NO EVENTS ON DISCHARG: HCPCS | Performed by: INTERNAL MEDICINE

## 2023-02-01 PROCEDURE — 88305 TISSUE EXAM BY PATHOLOGIST: CPT | Performed by: PATHOLOGY

## 2023-02-01 PROCEDURE — 88341 IMHCHEM/IMCYTCHM EA ADD ANTB: CPT | Performed by: PATHOLOGY

## 2023-02-01 PROCEDURE — 43239 EGD BIOPSY SINGLE/MULTIPLE: CPT | Performed by: INTERNAL MEDICINE

## 2023-02-01 PROCEDURE — 88342 IMHCHEM/IMCYTCHM 1ST ANTB: CPT | Performed by: PATHOLOGY

## 2023-02-01 RX ORDER — LISINOPRIL AND HYDROCHLOROTHIAZIDE TABLETS 10; 12.5 MG/1; MG/1
TAKE 1 TABLET DAILY TABLET ORAL
Refills: 0 | Status: ACTIVE | COMMUNITY

## 2023-02-01 RX ORDER — ESTRADIOL 1 MG/1
TAKE 1 TABLET DAILY AS DIRECTED TABLET ORAL
Refills: 0 | Status: ACTIVE | COMMUNITY

## 2023-02-01 RX ORDER — CYANOCOBALAMIN 1000 UG/ML
AS DIRECTED INJECTION INTRAMUSCULAR; SUBCUTANEOUS WEEKLY
Refills: 3 | Status: ACTIVE | COMMUNITY

## 2023-02-01 RX ORDER — LEVOTHYROXINE SODIUM 100 UG/1
TAKE 1 TABLET DAILY TABLET ORAL
Refills: 0 | Status: ON HOLD | COMMUNITY

## 2023-02-01 RX ORDER — AMITRIPTYLINE HYDROCHLORIDE 10 MG/1
TAKE 1 TABLET AT BEDTIME TABLET, FILM COATED ORAL
Qty: 30 EACH | Refills: 3 | Status: ACTIVE | COMMUNITY

## 2023-02-01 RX ORDER — FAMOTIDINE 40 MG/1
1 TABLET AT BEDTIME TABLET, FILM COATED ORAL ONCE A DAY
Qty: 90 | Refills: 3 | Status: ACTIVE | COMMUNITY
Start: 2022-12-19

## 2023-02-01 RX ORDER — LEVOTHYROXINE SODIUM 137 UG/1
1 CAPSULE IN THE MORNING ON AN EMPTY STOMACH CAPSULE ORAL ONCE A DAY
Status: ACTIVE | COMMUNITY

## 2023-02-01 RX ORDER — PANTOPRAZOLE SODIUM 40 MG/1
TAKE 1 TABLET BY MOUTH TWICE DAILY TABLET, DELAYED RELEASE ORAL
Qty: 180 TABLET | Refills: 1 | Status: ON HOLD | COMMUNITY

## 2023-02-01 RX ORDER — FENOFIBRATE 145 MG/1
TAKE 1 TABLET DAILY TABLET, FILM COATED ORAL
Refills: 0 | Status: ACTIVE | COMMUNITY

## 2023-02-01 RX ORDER — PANTOPRAZOLE SODIUM 40 MG/1
1 TABLET TABLET, DELAYED RELEASE ORAL TWICE A DAY
Qty: 180 TABLET | Refills: 3 | Status: ACTIVE | COMMUNITY

## 2023-02-15 ENCOUNTER — OFFICE VISIT (OUTPATIENT)
Dept: URBAN - METROPOLITAN AREA CLINIC 113 | Facility: CLINIC | Age: 48
End: 2023-02-15

## 2023-02-15 NOTE — HPI-TODAY'S VISIT:
47 presents for follow-up after EGD.  She does have a history of pernicious anemia with intestinal metaplasia.  She was planned for repeat EGD and biopsies.  She was recommended daily MiraLAX for constipation and she was to continue amitriptyline for her suspected functional abdominal pain.  EGD 2/1/2023: Diffuse mild inflammation in the gastric body and antrum, biopsied.  Normal esophagus and duodenum.  We do not have pathology for review.  12/19/22 47-year-old female presenting for follow-up.  She was last seen in January 2021.  She does have a history of autoimmune gastritis and focal intestinal metaplasia. She was last seen 2 years ago. She continues to have intermittent epigastric pain and discomfort.  Currently she does take protonix BID. She does have breakthrough.  1/7/21 This is a 45-year-old female with a history of autoimmune gastritis and focal metaplasia, intermittent nausea and vomiting and generalized abdominal pain presenting for follow-up.  She was last seen 10/6/2020.  She had a gastric emptying study performed but the results were not available at the time of her visit.  She had previous labs ordered which she had forgotten to complete.  She had improved some 2 weeks before her visit.  Labs were ordered.  She was instructed to repeat her EGD in a year.  She was prescribed AcipHex and instructed to observe small, frequent meals.  She called our office in early December stating that AcipHex was no longer working.  She was prescribed pantoprazole.  She reports occasional bad days during which she has abdominal pain.  This occurs once or twice a week.  She states that "puts me in bed."  She has occasional associated dizziness.  Afterward, she feels fatigued.  She is taking pantoprazole 40 mg daily.  She reports symptoms were better control when she was on twice a day medication.  She is having heartburn 3 times a week for which she is taking Tums.  She denies dysphagia.  She has intermittent episodes of nausea.  She vomits once a week or less.  She has modified her diet and, when she has episodes, she limits food and drinks liquids.  She feels as though abdominal pain and nausea is manageable and does not feel as though she would benefit from a medication for symptomatic relief.  She is having constipation.  She reports days each week when she does not have a bowel movement.  She is taking magnesium citrate once a week.  She has tried Benefiber in the past and reports some benefit.  She tried taking Metamucil and it worsened her symptoms.  She has not tried MiraLAX. Her weight is stable. She receives B12 injections weekly with her primary care provider.  She previously had a gastric emptying study at [facility]. The results are unavailable.  Labs 10/21/2020 : Vitamin B12 1004, folate 4.1, antiparietal cell antibody 83.3, intrinsic factor 1.0.  CBC: WBC 5.3, hemoglobin 11.6, MCV 91, platelet 193.  BMP normal.  LFTs normal TB 0.2, ALP 49, ALT 21, AST 20.  TSH 0.383.  Free T4 1.66.  Lipid panel normal with the exception of cholesterol 200, triglycerides 317,  VLDL 53.

## 2023-02-22 PROBLEM — 235595009 GASTROESOPHAGEAL REFLUX DISEASE: Status: ACTIVE | Noted: 2023-02-22

## 2023-02-22 PROBLEM — 84568007 ATROPHIC GASTRITIS: Status: ACTIVE | Noted: 2023-02-22

## 2023-03-22 ENCOUNTER — OFFICE VISIT (OUTPATIENT)
Dept: URBAN - METROPOLITAN AREA CLINIC 113 | Facility: CLINIC | Age: 48
End: 2023-03-22

## 2023-06-29 ENCOUNTER — DASHBOARD ENCOUNTERS (OUTPATIENT)
Age: 48
End: 2023-06-29

## 2023-06-29 ENCOUNTER — OFFICE VISIT (OUTPATIENT)
Dept: URBAN - METROPOLITAN AREA CLINIC 113 | Facility: CLINIC | Age: 48
End: 2023-06-29
Payer: COMMERCIAL

## 2023-06-29 VITALS
BODY MASS INDEX: 35.53 KG/M2 | DIASTOLIC BLOOD PRESSURE: 74 MMHG | RESPIRATION RATE: 14 BRPM | TEMPERATURE: 97.7 F | HEIGHT: 60 IN | SYSTOLIC BLOOD PRESSURE: 116 MMHG | WEIGHT: 181 LBS | HEART RATE: 73 BPM

## 2023-06-29 DIAGNOSIS — K21.00 GASTROESOPHAGEAL REFLUX DISEASE WITH ESOPHAGITIS WITHOUT HEMORRHAGE: ICD-10-CM

## 2023-06-29 DIAGNOSIS — K59.09 OTHER CONSTIPATION: ICD-10-CM

## 2023-06-29 DIAGNOSIS — E53.8 B12 DEFICIENCY: ICD-10-CM

## 2023-06-29 DIAGNOSIS — R10.84 GENERALIZED ABDOMINAL PAIN: ICD-10-CM

## 2023-06-29 DIAGNOSIS — R11.2 INTRACTABLE VOMITING WITH NAUSEA, UNSPECIFIED VOMITING TYPE: ICD-10-CM

## 2023-06-29 PROCEDURE — 99214 OFFICE O/P EST MOD 30 MIN: CPT | Performed by: INTERNAL MEDICINE

## 2023-06-29 RX ORDER — LISINOPRIL AND HYDROCHLOROTHIAZIDE TABLETS 10; 12.5 MG/1; MG/1
TAKE 1 TABLET DAILY TABLET ORAL
Refills: 0 | Status: ACTIVE | COMMUNITY

## 2023-06-29 RX ORDER — CYANOCOBALAMIN 1000 UG/ML
AS DIRECTED INJECTION INTRAMUSCULAR; SUBCUTANEOUS WEEKLY
Refills: 3 | Status: ACTIVE | COMMUNITY

## 2023-06-29 RX ORDER — PANTOPRAZOLE SODIUM 40 MG/1
1 TABLET TABLET, DELAYED RELEASE ORAL TWICE A DAY
Qty: 180 TABLET | Refills: 3 | Status: ACTIVE | COMMUNITY

## 2023-06-29 RX ORDER — PANTOPRAZOLE SODIUM 40 MG/1
TAKE 1 TABLET BY MOUTH TWICE DAILY TABLET, DELAYED RELEASE ORAL
Qty: 180 TABLET | Refills: 1 | Status: ON HOLD | COMMUNITY

## 2023-06-29 RX ORDER — AMITRIPTYLINE HYDROCHLORIDE 10 MG/1
TAKE 1 TABLET AT BEDTIME TABLET, FILM COATED ORAL
Qty: 30 EACH | Refills: 3 | Status: ACTIVE | COMMUNITY

## 2023-06-29 RX ORDER — METFORMIN HYDROCHLORIDE 500 MG/1
1 TABLET WITH A MEAL TABLET, FILM COATED ORAL ONCE A DAY
Status: ACTIVE | COMMUNITY

## 2023-06-29 RX ORDER — LEVOTHYROXINE SODIUM 137 UG/1
1 CAPSULE IN THE MORNING ON AN EMPTY STOMACH CAPSULE ORAL ONCE A DAY
Status: ACTIVE | COMMUNITY

## 2023-06-29 RX ORDER — FENOFIBRATE 145 MG/1
TAKE 1 TABLET DAILY TABLET, FILM COATED ORAL
Refills: 0 | Status: ACTIVE | COMMUNITY

## 2023-06-29 RX ORDER — FAMOTIDINE 40 MG/1
1 TABLET AT BEDTIME TABLET, FILM COATED ORAL ONCE A DAY
Qty: 90 | Refills: 3 | Status: ACTIVE | COMMUNITY
Start: 2022-12-19

## 2023-06-29 RX ORDER — LEVOTHYROXINE SODIUM 100 UG/1
TAKE 1 TABLET DAILY TABLET ORAL
Refills: 0 | Status: ON HOLD | COMMUNITY

## 2023-06-29 RX ORDER — ESTRADIOL 1 MG/1
TAKE 1 TABLET DAILY AS DIRECTED TABLET ORAL
Refills: 0 | Status: ACTIVE | COMMUNITY

## 2023-06-29 NOTE — HPI-TODAY'S VISIT:
47 presents for follow-up after EGD.  She does have a history of pernicious anemia with intestinal metaplasia.  She was planned for repeat EGD and biopsies.  She was recommended daily MiraLAX for constipation and she was to continue amitriptyline for her suspected functional abdominal pain. She does have increased post-nasal drip and GERD which is worse at night. She has been on famotidine nightly and this helps.   EGD 2/1/2023: Diffuse mild inflammation in the gastric body and antrum, biopsied.  Normal esophagus and duodenum.  We do not have pathology for review.  12/19/22 47-year-old female presenting for follow-up.  She was last seen in January 2021.  She does have a history of autoimmune gastritis and focal intestinal metaplasia. She was last seen 2 years ago. She continues to have intermittent epigastric pain and discomfort.  Currently she does take protonix BID. She does have breakthrough.  1/7/21 This is a 45-year-old female with a history of autoimmune gastritis and focal metaplasia, intermittent nausea and vomiting and generalized abdominal pain presenting for follow-up.  She was last seen 10/6/2020.  She had a gastric emptying study performed but the results were not available at the time of her visit.  She had previous labs ordered which she had forgotten to complete.  She had improved some 2 weeks before her visit.  Labs were ordered.  She was instructed to repeat her EGD in a year.  She was prescribed AcipHex and instructed to observe small, frequent meals.  She called our office in early December stating that AcipHex was no longer working.  She was prescribed pantoprazole.  She reports occasional bad days during which she has abdominal pain.  This occurs once or twice a week.  She states that "puts me in bed."  She has occasional associated dizziness.  Afterward, she feels fatigued.  She is taking pantoprazole 40 mg daily.  She reports symptoms were better control when she was on twice a day medication.  She is having heartburn 3 times a week for which she is taking Tums.  She denies dysphagia.  She has intermittent episodes of nausea.  She vomits once a week or less.  She has modified her diet and, when she has episodes, she limits food and drinks liquids.  She feels as though abdominal pain and nausea is manageable and does not feel as though she would benefit from a medication for symptomatic relief.  She is having constipation.  She reports days each week when she does not have a bowel movement.  She is taking magnesium citrate once a week.  She has tried Benefiber in the past and reports some benefit.  She tried taking Metamucil and it worsened her symptoms.  She has not tried MiraLAX. Her weight is stable. She receives B12 injections weekly with her primary care provider.  She previously had a gastric emptying study at [facility]. The results are unavailable.  Labs 10/21/2020 : Vitamin B12 1004, folate 4.1, antiparietal cell antibody 83.3, intrinsic factor 1.0.  CBC: WBC 5.3, hemoglobin 11.6, MCV 91, platelet 193.  BMP normal.  LFTs normal TB 0.2, ALP 49, ALT 21, AST 20.  TSH 0.383.  Free T4 1.66.  Lipid panel normal with the exception of cholesterol 200, triglycerides 317,  VLDL 53.

## 2023-09-07 ENCOUNTER — TELEPHONE ENCOUNTER (OUTPATIENT)
Dept: URBAN - METROPOLITAN AREA CLINIC 113 | Facility: CLINIC | Age: 48
End: 2023-09-07

## 2023-09-07 RX ORDER — FAMOTIDINE 40 MG/1
1 TABLET AT BEDTIME TABLET, FILM COATED ORAL ONCE A DAY
Qty: 90 | Refills: 3 | Status: ACTIVE | COMMUNITY
Start: 2022-12-19

## 2023-09-07 RX ORDER — LEVOTHYROXINE SODIUM 137 UG/1
1 CAPSULE IN THE MORNING ON AN EMPTY STOMACH CAPSULE ORAL ONCE A DAY
Status: ACTIVE | COMMUNITY

## 2023-09-07 RX ORDER — METFORMIN HYDROCHLORIDE 500 MG/1
1 TABLET WITH A MEAL TABLET, FILM COATED ORAL ONCE A DAY
Status: ACTIVE | COMMUNITY

## 2023-09-07 RX ORDER — SODIUM, POTASSIUM,MAG SULFATES 17.5-3.13G
354ML SOLUTION, RECONSTITUTED, ORAL ORAL
Qty: 354 ML | Refills: 0 | OUTPATIENT
Start: 2023-09-12 | End: 2023-09-13

## 2023-09-07 RX ORDER — LEVOTHYROXINE SODIUM 100 UG/1
TAKE 1 TABLET DAILY TABLET ORAL
Refills: 0 | Status: ON HOLD | COMMUNITY

## 2023-09-07 RX ORDER — PANTOPRAZOLE SODIUM 40 MG/1
1 TABLET TABLET, DELAYED RELEASE ORAL TWICE A DAY
Qty: 180 TABLET | Refills: 3 | Status: ACTIVE | COMMUNITY

## 2023-09-07 RX ORDER — LISINOPRIL AND HYDROCHLOROTHIAZIDE TABLETS 10; 12.5 MG/1; MG/1
TAKE 1 TABLET DAILY TABLET ORAL
Refills: 0 | Status: ACTIVE | COMMUNITY

## 2023-09-07 RX ORDER — PANTOPRAZOLE SODIUM 40 MG/1
TAKE 1 TABLET BY MOUTH TWICE DAILY TABLET, DELAYED RELEASE ORAL
Qty: 180 TABLET | Refills: 1 | Status: ON HOLD | COMMUNITY

## 2023-09-07 RX ORDER — CYANOCOBALAMIN 1000 UG/ML
AS DIRECTED INJECTION INTRAMUSCULAR; SUBCUTANEOUS WEEKLY
Refills: 3 | Status: ACTIVE | COMMUNITY

## 2023-09-07 RX ORDER — AMITRIPTYLINE HYDROCHLORIDE 10 MG/1
TAKE 1 TABLET AT BEDTIME TABLET, FILM COATED ORAL
Qty: 30 EACH | Refills: 3 | Status: ACTIVE | COMMUNITY

## 2023-09-07 RX ORDER — FENOFIBRATE 145 MG/1
TAKE 1 TABLET DAILY TABLET, FILM COATED ORAL
Refills: 0 | Status: ACTIVE | COMMUNITY

## 2023-09-07 RX ORDER — ESTRADIOL 1 MG/1
TAKE 1 TABLET DAILY AS DIRECTED TABLET ORAL
Refills: 0 | Status: ACTIVE | COMMUNITY

## 2023-10-11 ENCOUNTER — OUT OF OFFICE VISIT (OUTPATIENT)
Dept: URBAN - METROPOLITAN AREA SURGERY CENTER 25 | Facility: SURGERY CENTER | Age: 48
End: 2023-10-11
Payer: COMMERCIAL

## 2023-10-11 ENCOUNTER — CLAIMS CREATED FROM THE CLAIM WINDOW (OUTPATIENT)
Dept: URBAN - METROPOLITAN AREA SURGERY CENTER 25 | Facility: SURGERY CENTER | Age: 48
End: 2023-10-11

## 2023-10-11 DIAGNOSIS — Z86.010 ADENOMAS PERSONAL HISTORY OF COLONIC POLYPS: ICD-10-CM

## 2023-10-11 DIAGNOSIS — Z12.11 COLON CANCER SCREENING (HIGH RISK): ICD-10-CM

## 2023-10-11 DIAGNOSIS — K64.0 FIRST DEGREE HEMORRHOIDS: ICD-10-CM

## 2023-10-11 DIAGNOSIS — Z86.010 PERSONAL HISTORY OF COLON POLYPS: ICD-10-CM

## 2023-10-11 DIAGNOSIS — K57.30 COLON, DIVERTICULOSIS: ICD-10-CM

## 2023-10-11 PROCEDURE — 00811 ANES LWR INTST NDSC NOS: CPT | Performed by: ANESTHESIOLOGY

## 2023-10-11 PROCEDURE — G0105 COLORECTAL SCRN; HI RISK IND: HCPCS | Performed by: INTERNAL MEDICINE

## 2023-10-11 PROCEDURE — 00811 ANES LWR INTST NDSC NOS: CPT

## 2023-10-11 PROCEDURE — G8907 PT DOC NO EVENTS ON DISCHARG: HCPCS | Performed by: INTERNAL MEDICINE

## 2023-10-11 RX ORDER — LISINOPRIL AND HYDROCHLOROTHIAZIDE TABLETS 10; 12.5 MG/1; MG/1
TAKE 1 TABLET DAILY TABLET ORAL
Refills: 0 | Status: ACTIVE | COMMUNITY

## 2023-10-11 RX ORDER — CYANOCOBALAMIN 1000 UG/ML
AS DIRECTED INJECTION INTRAMUSCULAR; SUBCUTANEOUS WEEKLY
Refills: 3 | Status: ACTIVE | COMMUNITY

## 2023-10-11 RX ORDER — LEVOTHYROXINE SODIUM 100 UG/1
TAKE 1 TABLET DAILY TABLET ORAL
Refills: 0 | Status: ON HOLD | COMMUNITY

## 2023-10-11 RX ORDER — METFORMIN HYDROCHLORIDE 500 MG/1
1 TABLET WITH A MEAL TABLET, FILM COATED ORAL ONCE A DAY
Status: ACTIVE | COMMUNITY

## 2023-10-11 RX ORDER — LEVOTHYROXINE SODIUM 137 UG/1
1 CAPSULE IN THE MORNING ON AN EMPTY STOMACH CAPSULE ORAL ONCE A DAY
Status: ACTIVE | COMMUNITY

## 2023-10-11 RX ORDER — FAMOTIDINE 40 MG/1
1 TABLET AT BEDTIME TABLET, FILM COATED ORAL ONCE A DAY
Qty: 90 | Refills: 3 | Status: ACTIVE | COMMUNITY
Start: 2022-12-19

## 2023-10-11 RX ORDER — ESTRADIOL 1 MG/1
TAKE 1 TABLET DAILY AS DIRECTED TABLET ORAL
Refills: 0 | Status: ACTIVE | COMMUNITY

## 2023-10-11 RX ORDER — AMITRIPTYLINE HYDROCHLORIDE 10 MG/1
TAKE 1 TABLET AT BEDTIME TABLET, FILM COATED ORAL
Qty: 30 EACH | Refills: 3 | Status: ACTIVE | COMMUNITY

## 2023-10-11 RX ORDER — FENOFIBRATE 145 MG/1
TAKE 1 TABLET DAILY TABLET, FILM COATED ORAL
Refills: 0 | Status: ACTIVE | COMMUNITY

## 2023-10-11 RX ORDER — PANTOPRAZOLE SODIUM 40 MG/1
TAKE 1 TABLET BY MOUTH TWICE DAILY TABLET, DELAYED RELEASE ORAL
Qty: 180 TABLET | Refills: 1 | Status: ON HOLD | COMMUNITY

## 2023-10-11 RX ORDER — PANTOPRAZOLE SODIUM 40 MG/1
1 TABLET TABLET, DELAYED RELEASE ORAL TWICE A DAY
Qty: 180 TABLET | Refills: 3 | Status: ACTIVE | COMMUNITY

## 2023-11-01 ENCOUNTER — WEB ENCOUNTER (OUTPATIENT)
Dept: URBAN - METROPOLITAN AREA CLINIC 113 | Facility: CLINIC | Age: 48
End: 2023-11-01

## 2023-11-01 RX ORDER — FAMOTIDINE 40 MG/1
1 TABLET AT BEDTIME TABLET, FILM COATED ORAL ONCE A DAY
Qty: 90 | Refills: 3
Start: 2022-12-19

## 2023-11-01 RX ORDER — AMITRIPTYLINE HYDROCHLORIDE 10 MG/1
TAKE 1 TABLET AT BEDTIME TABLET, FILM COATED ORAL
Qty: 30 EACH | Refills: 3

## 2023-11-06 ENCOUNTER — ERX REFILL RESPONSE (OUTPATIENT)
Dept: URBAN - METROPOLITAN AREA CLINIC 113 | Facility: CLINIC | Age: 48
End: 2023-11-06

## 2023-11-06 RX ORDER — AMITRIPTYLINE HYDROCHLORIDE 10 MG/1
TAKE 1 TABLET AT BEDTIME TABLET, FILM COATED ORAL
Qty: 30 EACH | Refills: 0 | OUTPATIENT

## 2023-12-21 ENCOUNTER — ERX REFILL RESPONSE (OUTPATIENT)
Dept: URBAN - METROPOLITAN AREA CLINIC 113 | Facility: CLINIC | Age: 48
End: 2023-12-21

## 2023-12-21 RX ORDER — PANTOPRAZOLE SODIUM 40 MG/1
1 TABLET TABLET, DELAYED RELEASE ORAL TWICE A DAY
Qty: 180 TABLET | Refills: 3 | OUTPATIENT

## 2023-12-21 RX ORDER — PANTOPRAZOLE SODIUM 40 MG/1
TAKE 1 TABLET BY MOUTH TWICE DAILY TABLET, DELAYED RELEASE ORAL
Qty: 180 EACH | Refills: 6 | OUTPATIENT

## 2024-06-24 ENCOUNTER — ERX REFILL RESPONSE (OUTPATIENT)
Dept: URBAN - METROPOLITAN AREA CLINIC 113 | Facility: CLINIC | Age: 49
End: 2024-06-24

## 2024-06-24 RX ORDER — AMITRIPTYLINE HYDROCHLORIDE 10 MG/1
TAKE 1 TABLET BY MOUTH AT BEDTIME TABLET, FILM COATED ORAL
Qty: 30 EACH | Refills: 0 | OUTPATIENT

## 2024-06-24 RX ORDER — AMITRIPTYLINE HYDROCHLORIDE 10 MG/1
TAKE 1 TABLET BY MOUTH AT BEDTIME TABLET, FILM COATED ORAL
Qty: 30 EACH | Refills: 3 | OUTPATIENT

## 2024-06-27 ENCOUNTER — ERX REFILL RESPONSE (OUTPATIENT)
Dept: URBAN - METROPOLITAN AREA CLINIC 113 | Facility: CLINIC | Age: 49
End: 2024-06-27

## 2024-06-27 RX ORDER — AMITRIPTYLINE HYDROCHLORIDE 10 MG/1
TAKE 1 TABLET BY MOUTH AT BEDTIME TABLET, FILM COATED ORAL
Qty: 30 EACH | Refills: 0 | OUTPATIENT

## 2024-06-27 RX ORDER — AMITRIPTYLINE HYDROCHLORIDE 10 MG/1
TAKE 1 TABLET BY MOUTH AT BEDTIME TABLET, FILM COATED ORAL
Qty: 30 EACH | Refills: 3 | OUTPATIENT

## 2024-08-22 ENCOUNTER — LAB OUTSIDE AN ENCOUNTER (OUTPATIENT)
Dept: URBAN - METROPOLITAN AREA CLINIC 113 | Facility: CLINIC | Age: 49
End: 2024-08-22

## 2024-08-22 ENCOUNTER — TELEPHONE ENCOUNTER (OUTPATIENT)
Dept: URBAN - METROPOLITAN AREA CLINIC 113 | Facility: CLINIC | Age: 49
End: 2024-08-22

## 2024-08-22 ENCOUNTER — OFFICE VISIT (OUTPATIENT)
Dept: URBAN - METROPOLITAN AREA CLINIC 113 | Facility: CLINIC | Age: 49
End: 2024-08-22
Payer: COMMERCIAL

## 2024-08-22 VITALS
TEMPERATURE: 97.9 F | SYSTOLIC BLOOD PRESSURE: 117 MMHG | WEIGHT: 166.2 LBS | HEART RATE: 72 BPM | BODY MASS INDEX: 32.63 KG/M2 | DIASTOLIC BLOOD PRESSURE: 77 MMHG | HEIGHT: 60 IN

## 2024-08-22 DIAGNOSIS — R10.84 GENERALIZED ABDOMINAL PAIN: ICD-10-CM

## 2024-08-22 DIAGNOSIS — K31.89 INTESTINAL METAPLASIA OF GASTRIC MUCOSA: ICD-10-CM

## 2024-08-22 DIAGNOSIS — K21.00 GASTROESOPHAGEAL REFLUX DISEASE WITH ESOPHAGITIS WITHOUT HEMORRHAGE: ICD-10-CM

## 2024-08-22 DIAGNOSIS — K29.40 CHRONIC ATROPHIC GASTRITIS WITHOUT BLEEDING: ICD-10-CM

## 2024-08-22 PROCEDURE — 99203 OFFICE O/P NEW LOW 30 MIN: CPT | Performed by: NURSE PRACTITIONER

## 2024-08-22 RX ORDER — LEVOTHYROXINE SODIUM 100 UG/1
TAKE 1 TABLET DAILY TABLET ORAL
Refills: 0 | Status: ON HOLD | COMMUNITY

## 2024-08-22 RX ORDER — PANTOPRAZOLE SODIUM 40 MG/1
TAKE 1 TABLET BY MOUTH TWICE DAILY TABLET, DELAYED RELEASE ORAL
Qty: 180 EACH | Refills: 6 | Status: ACTIVE | COMMUNITY

## 2024-08-22 RX ORDER — SEMAGLUTIDE 0.5 MG/.5ML
0.5 ML INJECTION, SOLUTION SUBCUTANEOUS
Status: ACTIVE | COMMUNITY

## 2024-08-22 RX ORDER — FENOFIBRATE 145 MG/1
TAKE 1 TABLET DAILY TABLET, FILM COATED ORAL
Refills: 0 | Status: ACTIVE | COMMUNITY

## 2024-08-22 RX ORDER — AMITRIPTYLINE HYDROCHLORIDE 10 MG/1
TAKE 1 TABLET BY MOUTH AT BEDTIME TABLET, FILM COATED ORAL
Qty: 30 EACH | Refills: 3 | Status: ACTIVE | COMMUNITY

## 2024-08-22 RX ORDER — CYANOCOBALAMIN 1000 UG/ML
AS DIRECTED INJECTION INTRAMUSCULAR; SUBCUTANEOUS WEEKLY
Refills: 3 | Status: ACTIVE | COMMUNITY

## 2024-08-22 RX ORDER — LISINOPRIL AND HYDROCHLOROTHIAZIDE TABLETS 10; 12.5 MG/1; MG/1
TAKE 1 TABLET DAILY TABLET ORAL
Refills: 0 | Status: ACTIVE | COMMUNITY

## 2024-08-22 RX ORDER — ESTRADIOL 1 MG/1
TAKE 1 TABLET DAILY AS DIRECTED TABLET ORAL
Refills: 0 | Status: ACTIVE | COMMUNITY

## 2024-08-22 RX ORDER — METFORMIN HYDROCHLORIDE 500 MG/1
1 TABLET WITH A MEAL TABLET, FILM COATED ORAL ONCE A DAY
Status: ACTIVE | COMMUNITY

## 2024-08-22 RX ORDER — FAMOTIDINE 40 MG/1
1 TABLET AT BEDTIME TABLET, FILM COATED ORAL ONCE A DAY
Qty: 90 | Refills: 3 | Status: ACTIVE | COMMUNITY
Start: 2022-12-19

## 2024-08-22 RX ORDER — LEVOTHYROXINE SODIUM 137 UG/1
1 CAPSULE IN THE MORNING ON AN EMPTY STOMACH CAPSULE ORAL ONCE A DAY
Status: ON HOLD | COMMUNITY

## 2024-08-22 NOTE — HPI-TODAY'S VISIT:
This is a 48-year-old female with a history of hypertension, hyperlipidemia, hypothyroidism, autoimmune gastritis and focal metaplasia, intermittent nausea and vomiting, generalized abdominal pain, and constipation presenting for annual follow-up. She was last seen in the office 6/29/2023 for GERD, constipation, abdominal pain, and pernicious anemia.  She had undergone an EGD in February 2023 showing diffuse mild inflammation in the gastric body and antrum status post biopsy.  Pathology reported autoimmune atrophic gastritis with focal intestinal metaplasia.  EGD recommended in 1 year after past was reviewed. She is taking pantoprazole 40 mg once a day and famotidine 40 mg at bedtime.  She is also compliant with amitriptyline 10 mg at bedtime.  She reports occasional stomach discomfort describing as "it bothers me a little."  There may be some contribution from medication side effect.  She reports heartburn is controlled and amitriptyline has been helpful in preventing abdominal pain.  She has occasional nausea that may be associated with medication side effect.  She denies vomiting.  She reports fairly regular bowel habits.  She denies other abdominal symptoms. She was taking Zepbound and had side effects.  She was recently changed to Mounjaro.

## 2024-09-03 ENCOUNTER — CLAIMS CREATED FROM THE CLAIM WINDOW (OUTPATIENT)
Dept: URBAN - METROPOLITAN AREA SURGERY CENTER 25 | Facility: SURGERY CENTER | Age: 49
End: 2024-09-03
Payer: COMMERCIAL

## 2024-09-03 ENCOUNTER — CLAIMS CREATED FROM THE CLAIM WINDOW (OUTPATIENT)
Dept: URBAN - METROPOLITAN AREA CLINIC 4 | Facility: CLINIC | Age: 49
End: 2024-09-03
Payer: COMMERCIAL

## 2024-09-03 DIAGNOSIS — K31.89 OTHER DISEASES OF STOMACH AND DUODENUM: ICD-10-CM

## 2024-09-03 DIAGNOSIS — K31.A0 GASTRIC INTESTINAL METAPLASIA UNSPECIFIED: ICD-10-CM

## 2024-09-03 DIAGNOSIS — K31.A0 GASTRIC INTESTINAL METAPLASIA, UNSPECIFIED: ICD-10-CM

## 2024-09-03 DIAGNOSIS — R10.13 EPIGASTRIC PAIN: ICD-10-CM

## 2024-09-03 DIAGNOSIS — K31.A11 INTESTINAL METAPLASIA OF ANTRUM OF STOMACH WITHOUT DYSPLASIA: ICD-10-CM

## 2024-09-03 DIAGNOSIS — K29.70 GASTRITIS, UNSPECIFIED, WITHOUT BLEEDING: ICD-10-CM

## 2024-09-03 DIAGNOSIS — K29.40 ATROPHIC GASTRITIS WITHOUT HEMORRHAGE: ICD-10-CM

## 2024-09-03 DIAGNOSIS — K29.40 CHRONIC ATROPHIC GASTRITIS WITHOUT BLEEDING: ICD-10-CM

## 2024-09-03 PROCEDURE — 88342 IMHCHEM/IMCYTCHM 1ST ANTB: CPT | Performed by: PATHOLOGY

## 2024-09-03 PROCEDURE — 00731 ANES UPR GI NDSC PX NOS: CPT | Performed by: ANESTHESIOLOGY

## 2024-09-03 PROCEDURE — 00731 ANES UPR GI NDSC PX NOS: CPT | Performed by: NURSE ANESTHETIST, CERTIFIED REGISTERED

## 2024-09-03 PROCEDURE — 88341 IMHCHEM/IMCYTCHM EA ADD ANTB: CPT | Performed by: PATHOLOGY

## 2024-09-03 PROCEDURE — 43239 EGD BIOPSY SINGLE/MULTIPLE: CPT | Performed by: INTERNAL MEDICINE

## 2024-09-03 PROCEDURE — 88305 TISSUE EXAM BY PATHOLOGIST: CPT | Performed by: PATHOLOGY

## 2024-09-03 RX ORDER — FENOFIBRATE 145 MG/1
TAKE 1 TABLET DAILY TABLET, FILM COATED ORAL
Refills: 0 | Status: ACTIVE | COMMUNITY

## 2024-09-03 RX ORDER — CYANOCOBALAMIN 1000 UG/ML
AS DIRECTED INJECTION INTRAMUSCULAR; SUBCUTANEOUS WEEKLY
Refills: 3 | Status: ACTIVE | COMMUNITY

## 2024-09-03 RX ORDER — ESTRADIOL 1 MG/1
TAKE 1 TABLET DAILY AS DIRECTED TABLET ORAL
Refills: 0 | Status: ACTIVE | COMMUNITY

## 2024-09-03 RX ORDER — LISINOPRIL AND HYDROCHLOROTHIAZIDE TABLETS 10; 12.5 MG/1; MG/1
TAKE 1 TABLET DAILY TABLET ORAL
Refills: 0 | Status: ACTIVE | COMMUNITY

## 2024-09-03 RX ORDER — SEMAGLUTIDE 0.5 MG/.5ML
0.5 ML INJECTION, SOLUTION SUBCUTANEOUS
Status: ACTIVE | COMMUNITY

## 2024-09-03 RX ORDER — AMITRIPTYLINE HYDROCHLORIDE 10 MG/1
TAKE 1 TABLET BY MOUTH AT BEDTIME TABLET, FILM COATED ORAL
Qty: 30 EACH | Refills: 3 | Status: ACTIVE | COMMUNITY

## 2024-09-03 RX ORDER — METFORMIN HYDROCHLORIDE 500 MG/1
1 TABLET WITH A MEAL TABLET, FILM COATED ORAL ONCE A DAY
Status: ACTIVE | COMMUNITY

## 2024-09-03 RX ORDER — LEVOTHYROXINE SODIUM 100 UG/1
TAKE 1 TABLET DAILY TABLET ORAL
Refills: 0 | Status: ON HOLD | COMMUNITY

## 2024-09-03 RX ORDER — PANTOPRAZOLE SODIUM 40 MG/1
TAKE 1 TABLET BY MOUTH TWICE DAILY TABLET, DELAYED RELEASE ORAL
Qty: 180 EACH | Refills: 6 | Status: ACTIVE | COMMUNITY

## 2024-09-03 RX ORDER — FAMOTIDINE 40 MG/1
1 TABLET AT BEDTIME TABLET, FILM COATED ORAL ONCE A DAY
Qty: 90 | Refills: 3 | Status: ACTIVE | COMMUNITY
Start: 2022-12-19

## 2024-09-03 RX ORDER — LEVOTHYROXINE SODIUM 137 UG/1
1 CAPSULE IN THE MORNING ON AN EMPTY STOMACH CAPSULE ORAL ONCE A DAY
Status: ON HOLD | COMMUNITY

## 2025-03-12 NOTE — PHYSICAL EXAM GASTROINTESTINAL
Abdomen , soft, nontender, nondistended , no guarding or rigidity , no masses palpable , normal bowel sounds , Liver and Spleen , no hepatosplenomegaly
---

## 2025-05-25 ENCOUNTER — ERX REFILL RESPONSE (OUTPATIENT)
Dept: URBAN - METROPOLITAN AREA CLINIC 113 | Facility: CLINIC | Age: 50
End: 2025-05-25

## 2025-05-25 RX ORDER — FAMOTIDINE 40 MG/1
1 TABLET AT BEDTIME TABLET, FILM COATED ORAL ONCE A DAY
Qty: 90 TABLET | Refills: 3 | OUTPATIENT

## 2025-05-25 RX ORDER — FAMOTIDINE 40 MG/1
1 TABLET AT BEDTIME TABLET, FILM COATED ORAL ONCE A DAY
Qty: 90 | Refills: 3 | OUTPATIENT

## 2025-07-10 ENCOUNTER — ERX REFILL RESPONSE (OUTPATIENT)
Dept: URBAN - METROPOLITAN AREA CLINIC 113 | Facility: CLINIC | Age: 50
End: 2025-07-10

## 2025-07-10 RX ORDER — PANTOPRAZOLE 40 MG/1
TAKE 1 TABLET BY MOUTH TWICE DAILY TABLET, DELAYED RELEASE ORAL
Qty: 180 EACH | Refills: 0 | OUTPATIENT

## 2025-08-19 ENCOUNTER — OFFICE VISIT (OUTPATIENT)
Dept: URBAN - METROPOLITAN AREA CLINIC 113 | Facility: CLINIC | Age: 50
End: 2025-08-19
Payer: COMMERCIAL

## 2025-08-19 ENCOUNTER — LAB OUTSIDE AN ENCOUNTER (OUTPATIENT)
Dept: URBAN - METROPOLITAN AREA CLINIC 113 | Facility: CLINIC | Age: 50
End: 2025-08-19

## 2025-08-19 DIAGNOSIS — K21.00 GASTROESOPHAGEAL REFLUX DISEASE WITH ESOPHAGITIS WITHOUT HEMORRHAGE: ICD-10-CM

## 2025-08-19 DIAGNOSIS — K29.40 CHRONIC ATROPHIC GASTRITIS WITHOUT BLEEDING: ICD-10-CM

## 2025-08-19 DIAGNOSIS — K31.89 INTESTINAL METAPLASIA OF GASTRIC MUCOSA: ICD-10-CM

## 2025-08-19 DIAGNOSIS — R10.84 GENERALIZED ABDOMINAL PAIN: ICD-10-CM

## 2025-08-19 PROCEDURE — 99214 OFFICE O/P EST MOD 30 MIN: CPT | Performed by: NURSE PRACTITIONER

## 2025-08-19 RX ORDER — FAMOTIDINE 40 MG/1
1 TABLET AT BEDTIME TABLET, FILM COATED ORAL ONCE A DAY
Qty: 90 TABLET | Refills: 3 | Status: ACTIVE | COMMUNITY

## 2025-08-19 RX ORDER — SEMAGLUTIDE 0.5 MG/.5ML
0.5 ML INJECTION, SOLUTION SUBCUTANEOUS
Status: ACTIVE | COMMUNITY

## 2025-08-19 RX ORDER — FENOFIBRATE 145 MG/1
TAKE 1 TABLET DAILY TABLET, FILM COATED ORAL
Refills: 0 | Status: ACTIVE | COMMUNITY

## 2025-08-19 RX ORDER — LISINOPRIL AND HYDROCHLOROTHIAZIDE TABLETS 10; 12.5 MG/1; MG/1
TAKE 1 TABLET DAILY TABLET ORAL
Refills: 0 | Status: ACTIVE | COMMUNITY

## 2025-08-19 RX ORDER — LEVOTHYROXINE SODIUM 137 UG/1
1 CAPSULE IN THE MORNING ON AN EMPTY STOMACH CAPSULE ORAL ONCE A DAY
Status: ON HOLD | COMMUNITY

## 2025-08-19 RX ORDER — ESTRADIOL 1 MG/1
TAKE 1 TABLET DAILY AS DIRECTED TABLET ORAL
Refills: 0 | Status: ACTIVE | COMMUNITY

## 2025-08-19 RX ORDER — PANTOPRAZOLE 40 MG/1
TAKE 1 TABLET BY MOUTH TWICE DAILY TABLET, DELAYED RELEASE ORAL
Qty: 180 EACH | Refills: 0 | Status: ACTIVE | COMMUNITY

## 2025-08-19 RX ORDER — AMITRIPTYLINE HYDROCHLORIDE 10 MG/1
TAKE 1 TABLET BY MOUTH AT BEDTIME TABLET, FILM COATED ORAL
Qty: 30 EACH | Refills: 3 | Status: ACTIVE | COMMUNITY

## 2025-08-19 RX ORDER — LEVOTHYROXINE SODIUM 100 UG/1
TAKE 1 TABLET DAILY TABLET ORAL
Refills: 0 | Status: ON HOLD | COMMUNITY

## 2025-08-19 RX ORDER — CYANOCOBALAMIN 1000 UG/ML
AS DIRECTED INJECTION INTRAMUSCULAR; SUBCUTANEOUS WEEKLY
Refills: 3 | Status: ACTIVE | COMMUNITY

## 2025-08-19 RX ORDER — METFORMIN HYDROCHLORIDE 500 MG/1
1 TABLET WITH A MEAL TABLET, FILM COATED ORAL ONCE A DAY
Status: ACTIVE | COMMUNITY

## 2025-08-22 ENCOUNTER — OFFICE VISIT (OUTPATIENT)
Dept: URBAN - METROPOLITAN AREA CLINIC 113 | Facility: CLINIC | Age: 50
End: 2025-08-22

## 2025-08-26 ENCOUNTER — OFFICE VISIT (OUTPATIENT)
Dept: URBAN - METROPOLITAN AREA SURGERY CENTER 25 | Facility: SURGERY CENTER | Age: 50
End: 2025-08-26

## 2025-08-26 ENCOUNTER — CLAIMS CREATED FROM THE CLAIM WINDOW (OUTPATIENT)
Dept: URBAN - METROPOLITAN AREA CLINIC 4 | Facility: CLINIC | Age: 50
End: 2025-08-26
Payer: COMMERCIAL

## 2025-08-26 DIAGNOSIS — K29.70 GASTRITIS, UNSPECIFIED, WITHOUT BLEEDING: ICD-10-CM

## 2025-08-26 DIAGNOSIS — K29.40 CHRONIC ATROPHIC GASTRITIS WITHOUT BLEEDING: ICD-10-CM

## 2025-08-26 PROCEDURE — 88312 SPECIAL STAINS GROUP 1: CPT | Performed by: PATHOLOGY

## 2025-08-26 PROCEDURE — 88342 IMHCHEM/IMCYTCHM 1ST ANTB: CPT | Performed by: PATHOLOGY

## 2025-08-26 PROCEDURE — 88305 TISSUE EXAM BY PATHOLOGIST: CPT | Performed by: PATHOLOGY

## 2025-08-26 PROCEDURE — 88341 IMHCHEM/IMCYTCHM EA ADD ANTB: CPT | Performed by: PATHOLOGY

## 2025-08-26 RX ORDER — ESTRADIOL 1 MG/1
TAKE 1 TABLET DAILY AS DIRECTED TABLET ORAL
Refills: 0 | Status: ACTIVE | COMMUNITY

## 2025-08-26 RX ORDER — FAMOTIDINE 40 MG/1
1 TABLET AT BEDTIME TABLET, FILM COATED ORAL ONCE A DAY
Qty: 90 TABLET | Refills: 3 | Status: ACTIVE | COMMUNITY

## 2025-08-26 RX ORDER — LEVOTHYROXINE SODIUM 100 UG/1
TAKE 1 TABLET DAILY TABLET ORAL
Refills: 0 | Status: ON HOLD | COMMUNITY

## 2025-08-26 RX ORDER — LEVOTHYROXINE SODIUM 137 UG/1
1 CAPSULE IN THE MORNING ON AN EMPTY STOMACH CAPSULE ORAL ONCE A DAY
Status: ON HOLD | COMMUNITY

## 2025-08-26 RX ORDER — FENOFIBRATE 145 MG/1
TAKE 1 TABLET DAILY TABLET, FILM COATED ORAL
Refills: 0 | Status: ACTIVE | COMMUNITY

## 2025-08-26 RX ORDER — AMITRIPTYLINE HYDROCHLORIDE 10 MG/1
TAKE 1 TABLET BY MOUTH AT BEDTIME TABLET, FILM COATED ORAL
Qty: 30 EACH | Refills: 3 | Status: ACTIVE | COMMUNITY

## 2025-08-26 RX ORDER — PANTOPRAZOLE 40 MG/1
TAKE 1 TABLET BY MOUTH TWICE DAILY TABLET, DELAYED RELEASE ORAL
Qty: 180 EACH | Refills: 0 | Status: ACTIVE | COMMUNITY

## 2025-08-26 RX ORDER — METFORMIN HYDROCHLORIDE 500 MG/1
1 TABLET WITH A MEAL TABLET, FILM COATED ORAL ONCE A DAY
Status: ACTIVE | COMMUNITY

## 2025-08-26 RX ORDER — SEMAGLUTIDE 0.5 MG/.5ML
0.5 ML INJECTION, SOLUTION SUBCUTANEOUS
Status: ACTIVE | COMMUNITY

## 2025-08-26 RX ORDER — CYANOCOBALAMIN 1000 UG/ML
AS DIRECTED INJECTION INTRAMUSCULAR; SUBCUTANEOUS WEEKLY
Refills: 3 | Status: ACTIVE | COMMUNITY

## 2025-08-26 RX ORDER — LISINOPRIL AND HYDROCHLOROTHIAZIDE TABLETS 10; 12.5 MG/1; MG/1
TAKE 1 TABLET DAILY TABLET ORAL
Refills: 0 | Status: ACTIVE | COMMUNITY